# Patient Record
Sex: MALE | Race: WHITE | NOT HISPANIC OR LATINO | Employment: OTHER | URBAN - METROPOLITAN AREA
[De-identification: names, ages, dates, MRNs, and addresses within clinical notes are randomized per-mention and may not be internally consistent; named-entity substitution may affect disease eponyms.]

---

## 2019-10-04 NOTE — PATIENT DISCUSSION
Disc photos stable OU however IOP fluctuating.  Recommend repeating SLT.  Will plan OU.  Pt will have a .

## 2019-11-26 NOTE — PROCEDURE NOTE: CLINICAL
PROCEDURE NOTE: SLT OU. Diagnosis: POAG, Mild. Anesthesia: Topical. Prep: Alphagan 0.15%. Prior to treatment, risks/benefits/alternatives discussed including infection, loss of vision, hemorrhage, cataract, glaucoma, retinal tears or detachment. Lens:  SLT laser lens with goniosol. Power: 1.2mJ. Total applications: 839/106. Application 686 degrees. Patient tolerated procedure well. There were no complications. Post-op instructions given. Post-op IOP = 18/16 mmHg. Jocy Jain

## 2020-10-13 ENCOUNTER — ESTABLISHED COMPREHENSIVE EXAM (OUTPATIENT)
Dept: URBAN - METROPOLITAN AREA CLINIC 42 | Facility: CLINIC | Age: 60
End: 2020-10-13

## 2020-10-13 DIAGNOSIS — H52.223: ICD-10-CM

## 2020-10-13 DIAGNOSIS — H52.03: ICD-10-CM

## 2020-10-13 DIAGNOSIS — Z01.00: ICD-10-CM

## 2020-10-13 DIAGNOSIS — H52.4: ICD-10-CM

## 2020-10-13 PROCEDURE — 92014 COMPRE OPH EXAM EST PT 1/>: CPT

## 2020-10-13 PROCEDURE — 92015 DETERMINE REFRACTIVE STATE: CPT

## 2020-10-13 ASSESSMENT — VISUAL ACUITY
OD_SC: 20/50-1
OD_SC: 20/40
OS_SC: 20/200
OU_SC: 20/40
OS_SC: 20/100
OU_SC: 20/40-1

## 2020-10-13 ASSESSMENT — KERATOMETRY
OS_AXISANGLE_DEGREES: 6
OD_AXISANGLE2_DEGREES: 85
OS_K1POWER_DIOPTERS: 43.75
OD_K1POWER_DIOPTERS: 43.75
OD_AXISANGLE_DEGREES: 175
OS_K2POWER_DIOPTERS: 45.75
OD_K2POWER_DIOPTERS: 45.5
OS_AXISANGLE2_DEGREES: 96

## 2020-10-13 ASSESSMENT — TONOMETRY
OS_IOP_MMHG: 12
OD_IOP_MMHG: 13

## 2020-10-30 NOTE — PATIENT DISCUSSION
OCT NEW BASELINE OU. IOP FLUCTUATING IN OU TODAY. WILL CONTINUE TO MONITOR FOR NOW. IF ELEVATED NEXT VISIT, THEN T/C ADDING TREATMENT.

## 2021-09-21 NOTE — PATIENT DISCUSSION
VISUAL FIELD SHOWS POSSIBLE WORSENING OS>OD. REC: LOWER IOP OU. DISCUSSED OPTIONS OF REPEATING SLT VS. STARTING A DROP VS. CE/IOL W/I-STENT.

## 2021-09-28 ENCOUNTER — ESTABLISHED COMPREHENSIVE EXAM (OUTPATIENT)
Dept: URBAN - METROPOLITAN AREA CLINIC 42 | Facility: CLINIC | Age: 61
End: 2021-09-28

## 2021-09-28 VITALS — DIASTOLIC BLOOD PRESSURE: 78 MMHG | SYSTOLIC BLOOD PRESSURE: 110 MMHG | HEIGHT: 60 IN

## 2021-09-28 DIAGNOSIS — H52.03: ICD-10-CM

## 2021-09-28 DIAGNOSIS — H52.223: ICD-10-CM

## 2021-09-28 DIAGNOSIS — H52.4: ICD-10-CM

## 2021-09-28 PROCEDURE — 92014 COMPRE OPH EXAM EST PT 1/>: CPT

## 2021-09-28 PROCEDURE — 92015 DETERMINE REFRACTIVE STATE: CPT

## 2021-09-28 ASSESSMENT — KERATOMETRY
OS_K1POWER_DIOPTERS: 43.75
OD_K2POWER_DIOPTERS: 45.50
OD_AXISANGLE_DEGREES: 175
OD_K2POWER_DIOPTERS: 45.5
OS_AXISANGLE_DEGREES: 002
OS_K2POWER_DIOPTERS: 45.50
OD_AXISANGLE2_DEGREES: 85
OD_K1POWER_DIOPTERS: 43.75
OS_AXISANGLE2_DEGREES: 92
OD_K1POWER_DIOPTERS: 44.00
OS_K1POWER_DIOPTERS: 44.00
OS_AXISANGLE_DEGREES: 6
OD_AXISANGLE_DEGREES: 174
OD_AXISANGLE2_DEGREES: 084
OS_AXISANGLE2_DEGREES: 96
OS_K2POWER_DIOPTERS: 45.75

## 2021-09-28 ASSESSMENT — VISUAL ACUITY
OS_SC: 20/70-1
OD_SC: 20/40-1
OS_SC: 20/50-1
OU_SC: 20/50+2
OU_SC: 20/25-2
OD_SC: 20/30+2

## 2021-09-28 ASSESSMENT — TONOMETRY
OD_IOP_MMHG: 19
OS_IOP_MMHG: 17

## 2021-10-21 NOTE — PATIENT DISCUSSION
PO W/ CFS OD AT [facility], NO VAN, PT WANTS COMM GTTS (ISSUES W/ MULTIPLE ABX, OK W/ SULFA, NO PMN), +OCUCOAT.

## 2022-10-06 ENCOUNTER — COMPREHENSIVE EXAM (OUTPATIENT)
Dept: URBAN - METROPOLITAN AREA CLINIC 42 | Facility: CLINIC | Age: 62
End: 2022-10-06

## 2022-10-06 DIAGNOSIS — H52.03: ICD-10-CM

## 2022-10-06 DIAGNOSIS — Z01.00: ICD-10-CM

## 2022-10-06 DIAGNOSIS — H52.223: ICD-10-CM

## 2022-10-06 DIAGNOSIS — H52.4: ICD-10-CM

## 2022-10-06 PROCEDURE — 92015 DETERMINE REFRACTIVE STATE: CPT

## 2022-10-06 PROCEDURE — 92014 COMPRE OPH EXAM EST PT 1/>: CPT

## 2022-10-06 ASSESSMENT — VISUAL ACUITY
OD_SC: 20/80
OS_SC: 20/40
OS_CC: 20/20
OS_CC: 20/20
OD_SC: 20/30+1
OS_SC: 20/100
OD_CC: 20/20
OD_CC: 20/20

## 2022-10-06 ASSESSMENT — KERATOMETRY
OD_AXISANGLE_DEGREES: 087
OS_AXISANGLE_DEGREES: 092
OS_AXISANGLE_DEGREES: 002
OD_K1POWER_DIOPTERS: 43.75
OS_AXISANGLE2_DEGREES: 2
OD_K2POWER_DIOPTERS: 44.0
OS_K2POWER_DIOPTERS: 45.50
OD_K2POWER_DIOPTERS: 45.5
OD_AXISANGLE_DEGREES: 174
OD_AXISANGLE2_DEGREES: 85
OD_AXISANGLE2_DEGREES: 084
OD_K2POWER_DIOPTERS: 45.50
OD_AXISANGLE2_DEGREES: 177
OS_AXISANGLE_DEGREES: 6
OD_K1POWER_DIOPTERS: 44.00
OD_AXISANGLE_DEGREES: 175
OS_K2POWER_DIOPTERS: 43.5
OS_AXISANGLE2_DEGREES: 96
OS_K1POWER_DIOPTERS: 44.00
OS_K1POWER_DIOPTERS: 45.0
OS_K2POWER_DIOPTERS: 45.75
OD_K1POWER_DIOPTERS: 45.25
OS_AXISANGLE2_DEGREES: 92
OS_K1POWER_DIOPTERS: 43.75

## 2022-10-06 ASSESSMENT — TONOMETRY
OS_IOP_MMHG: 16
OD_IOP_MMHG: 16

## 2023-10-04 ENCOUNTER — COMPREHENSIVE EXAM (OUTPATIENT)
Dept: URBAN - METROPOLITAN AREA CLINIC 42 | Facility: CLINIC | Age: 63
End: 2023-10-04

## 2023-10-04 DIAGNOSIS — H52.4: ICD-10-CM

## 2023-10-04 DIAGNOSIS — H52.03: ICD-10-CM

## 2023-10-04 DIAGNOSIS — Z01.00: ICD-10-CM

## 2023-10-04 DIAGNOSIS — H52.223: ICD-10-CM

## 2023-10-04 PROCEDURE — 92015 DETERMINE REFRACTIVE STATE: CPT

## 2023-10-04 PROCEDURE — 92014 COMPRE OPH EXAM EST PT 1/>: CPT

## 2023-10-04 ASSESSMENT — KERATOMETRY
OD_K1POWER_DIOPTERS: 45.25
OD_AXISANGLE_DEGREES: 89
OS_AXISANGLE_DEGREES: 6
OD_AXISANGLE2_DEGREES: 179
OD_K2POWER_DIOPTERS: 44.00
OS_AXISANGLE2_DEGREES: 2
OS_K2POWER_DIOPTERS: 45.50
OS_K1POWER_DIOPTERS: 44.00
OD_K2POWER_DIOPTERS: 45.5
OD_K2POWER_DIOPTERS: 44.0
OS_AXISANGLE_DEGREES: 97
OS_K2POWER_DIOPTERS: 45.75
OS_AXISANGLE2_DEGREES: 7
OS_AXISANGLE2_DEGREES: 96
OS_K1POWER_DIOPTERS: 45.00
OD_AXISANGLE_DEGREES: 174
OS_K1POWER_DIOPTERS: 45.0
OD_K1POWER_DIOPTERS: 45.00
OS_AXISANGLE2_DEGREES: 92
OD_K1POWER_DIOPTERS: 44.00
OD_K1POWER_DIOPTERS: 43.75
OD_AXISANGLE2_DEGREES: 084
OD_AXISANGLE_DEGREES: 087
OS_AXISANGLE_DEGREES: 002
OS_K1POWER_DIOPTERS: 43.75
OD_AXISANGLE2_DEGREES: 177
OD_AXISANGLE_DEGREES: 175
OS_K2POWER_DIOPTERS: 43.00
OD_AXISANGLE2_DEGREES: 85
OS_K2POWER_DIOPTERS: 43.5
OS_AXISANGLE_DEGREES: 092
OD_K2POWER_DIOPTERS: 45.50

## 2023-10-04 ASSESSMENT — TONOMETRY
OS_IOP_MMHG: 16
OD_IOP_MMHG: 16

## 2024-11-05 ENCOUNTER — COMPREHENSIVE EXAM (OUTPATIENT)
Dept: URBAN - METROPOLITAN AREA CLINIC 42 | Facility: CLINIC | Age: 64
End: 2024-11-05

## 2024-11-05 DIAGNOSIS — Z01.00: ICD-10-CM

## 2024-11-05 DIAGNOSIS — H52.03: ICD-10-CM

## 2024-11-05 DIAGNOSIS — H52.4: ICD-10-CM

## 2024-11-05 DIAGNOSIS — H52.223: ICD-10-CM

## 2024-11-05 PROCEDURE — 92014 COMPRE OPH EXAM EST PT 1/>: CPT

## 2024-11-05 PROCEDURE — 92015 DETERMINE REFRACTIVE STATE: CPT
